# Patient Record
Sex: MALE | URBAN - METROPOLITAN AREA
[De-identification: names, ages, dates, MRNs, and addresses within clinical notes are randomized per-mention and may not be internally consistent; named-entity substitution may affect disease eponyms.]

---

## 2018-03-23 ENCOUNTER — RECORDS - HEALTHEAST (OUTPATIENT)
Dept: LAB | Facility: CLINIC | Age: 43
End: 2018-03-23

## 2018-03-26 LAB
QTF INTERPRETATION: ABNORMAL
QTF MITOGEN - NIL: >10 IU/ML
QTF NIL: 0.02 IU/ML
QTF RESULT: POSITIVE
QTF TB ANTIGEN - NIL: 0.57 IU/ML

## 2019-08-24 ENCOUNTER — TELEPHONE (OUTPATIENT)
Dept: FAMILY MEDICINE | Facility: CLINIC | Age: 44
End: 2019-08-24

## 2019-08-24 NOTE — TELEPHONE ENCOUNTER
Pt walked into clinic with head laceration    Pt was working in his garage around 1 am and has hit on the top of his head with metal box.      He did not black out or loss conciseness.   Wound did not bleed much.     Pt applied pressure and cleaned wound.    No bleeding over night.      Pt is  up to date with tetanus.      Wound is not bleeding at this time.  Does not appear deep or to need sutures.      PCP agrees with above- some tissue swelling so edges will not come together at this time.      Laceration coated with bacitracin and non adhesive dressing applied.  Pt to change this daily for the next 3- 4 days if out or in dirty area.  When home he is to leave this open to air.  F/U in clinic with any fever, pain, drainage or HA, or N/V    Pt expressed understanding and acceptance of the plan.  Pt had no further questions at this time.  Advised can call back to clinic at any time with concerns.     Message handled by Nurse Triage with [facility] - provider name: Morena Gomes MD.     Zeynep Melo RN